# Patient Record
Sex: FEMALE | Race: OTHER | NOT HISPANIC OR LATINO | URBAN - METROPOLITAN AREA
[De-identification: names, ages, dates, MRNs, and addresses within clinical notes are randomized per-mention and may not be internally consistent; named-entity substitution may affect disease eponyms.]

---

## 2022-06-14 ENCOUNTER — OFFICE VISIT (OUTPATIENT)
Dept: URBAN - METROPOLITAN AREA CLINIC 128 | Facility: CLINIC | Age: 62
End: 2022-06-14
Payer: OTHER GOVERNMENT

## 2022-06-14 ENCOUNTER — WEB ENCOUNTER (OUTPATIENT)
Dept: URBAN - METROPOLITAN AREA CLINIC 128 | Facility: CLINIC | Age: 62
End: 2022-06-14

## 2022-06-14 VITALS
WEIGHT: 162 LBS | SYSTOLIC BLOOD PRESSURE: 132 MMHG | HEIGHT: 61 IN | TEMPERATURE: 97.1 F | HEART RATE: 74 BPM | DIASTOLIC BLOOD PRESSURE: 82 MMHG | BODY MASS INDEX: 30.58 KG/M2

## 2022-06-14 DIAGNOSIS — Z80.0 FAMILY HISTORY OF COLON CANCER IN MOTHER: ICD-10-CM

## 2022-06-14 DIAGNOSIS — K21.9 GERD: ICD-10-CM

## 2022-06-14 DIAGNOSIS — R74.8 ELEVATED LIPASE: ICD-10-CM

## 2022-06-14 DIAGNOSIS — R13.19 OTHER DYSPHAGIA: ICD-10-CM

## 2022-06-14 PROBLEM — 235595009 GASTROESOPHAGEAL REFLUX DISEASE: Status: ACTIVE | Noted: 2022-06-14

## 2022-06-14 PROCEDURE — 99214 OFFICE O/P EST MOD 30 MIN: CPT | Performed by: PHYSICIAN ASSISTANT

## 2022-06-14 RX ORDER — DICYCLOMINE HYDROCHLORIDE 10 MG/1
TAKE 1 CAPSULE (10 MG) BY ORAL ROUTE 3 TIMES PER DAY PRN DIARRHEA CAPSULE ORAL
Qty: 30 | Refills: 0 | Status: ACTIVE | COMMUNITY
Start: 2020-03-18

## 2022-06-14 RX ORDER — PANTOPRAZOLE SODIUM 20 MG/1
1 TABLET TABLET, DELAYED RELEASE ORAL ONCE A DAY
Qty: 30 | Refills: 5 | OUTPATIENT
Start: 2022-06-14

## 2022-06-14 NOTE — HPI-OTHER HISTORIES
Patient elicits the following symptoms: esophageal burning. She went to [facility] at [place] on 22 due to the esophageal pain. She has been having dysphagia solids and liquids. Duration of symptoms: x 3 weeks Location of symptoms: esophageal Associated symptoms: belching Prior over the counter or prescription medications: none Current stress: yes Any recent weight changes: none Any recent changes in diet: none Any past history of gastric/esophageal ulcers or Barretts esophagus: none Previous work up- labs,imaging:  At the hospital her labs were all normal except for a lipase of 185. 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 She denies lower GI symptoms. She has2-3 BMs a day with no constipation or diarrhea. Patient's mother  of colon cancer.

## 2022-06-21 ENCOUNTER — OFFICE VISIT (OUTPATIENT)
Dept: URBAN - METROPOLITAN AREA CLINIC 128 | Facility: CLINIC | Age: 62
End: 2022-06-21

## 2023-03-16 ENCOUNTER — LAB OUTSIDE AN ENCOUNTER (OUTPATIENT)
Dept: URBAN - METROPOLITAN AREA CLINIC 128 | Facility: CLINIC | Age: 63
End: 2023-03-16

## 2023-03-16 ENCOUNTER — WEB ENCOUNTER (OUTPATIENT)
Dept: URBAN - METROPOLITAN AREA CLINIC 128 | Facility: CLINIC | Age: 63
End: 2023-03-16

## 2023-03-16 ENCOUNTER — OFFICE VISIT (OUTPATIENT)
Dept: URBAN - METROPOLITAN AREA CLINIC 128 | Facility: CLINIC | Age: 63
End: 2023-03-16
Payer: OTHER GOVERNMENT

## 2023-03-16 VITALS
WEIGHT: 164 LBS | BODY MASS INDEX: 30.96 KG/M2 | SYSTOLIC BLOOD PRESSURE: 122 MMHG | TEMPERATURE: 97.2 F | HEIGHT: 61 IN | DIASTOLIC BLOOD PRESSURE: 66 MMHG

## 2023-03-16 DIAGNOSIS — K59.01 CONSTIPATION BY DELAYED COLONIC TRANSIT: ICD-10-CM

## 2023-03-16 DIAGNOSIS — K92.1 HEMATOCHEZIA: ICD-10-CM

## 2023-03-16 DIAGNOSIS — R10.13 EPIGASTRIC PAIN: ICD-10-CM

## 2023-03-16 PROCEDURE — 99214 OFFICE O/P EST MOD 30 MIN: CPT | Performed by: INTERNAL MEDICINE

## 2023-03-16 RX ORDER — PANTOPRAZOLE SODIUM 20 MG/1
1 TABLET TABLET, DELAYED RELEASE ORAL ONCE A DAY
Qty: 30 | Refills: 5 | Status: ACTIVE | COMMUNITY
Start: 2022-06-14

## 2023-03-16 RX ORDER — DICYCLOMINE HYDROCHLORIDE 10 MG/1
TAKE 1 CAPSULE (10 MG) BY ORAL ROUTE 3 TIMES PER DAY PRN DIARRHEA CAPSULE ORAL
Qty: 30 | Refills: 0 | Status: ACTIVE | COMMUNITY
Start: 2020-03-18

## 2023-03-16 NOTE — HPI-TODAY'S VISIT:
Mrs. Cotton comes in to the office today for a follow up visit because of a 5 day hx of epigastric discomfort with radiation in to the back. The pain does not wake her from sleep but can make it hard ot sleep at night.  She offers increased stress lately.  No N/V.  No increased use of NSAIDs.  No typical pyrosis or regurgitation.  BMs have been slightly tending toward constipation lately but she had a small BM yesterday and a more typical BM today.  No hx of Liver, gallbladder, or pancreas disease.  Her discomfort is better since BM earlier today.  Yesterday she had a single episode of BRB on the toilet tissue-- no bleeding to with BM.  Colonoscopy last in 2020 -- mild neoterminal ileum inflammation -- rectal erythema but biopsy was normal.  She offers all bloodwork was normal about 2 weeks ago at routined physical examination.

## 2023-03-16 NOTE — PHYSICAL EXAM GASTROINTESTINAL
Abdomen soft, mldly tender to palpation of the epigastrium, nondistended, no masses palpable , normal bowel sounds   Liver and Spleen , no hepatomegaly present, liver edge nontender , spleen not palpable   Rectal: deferred

## 2023-03-28 ENCOUNTER — LAB OUTSIDE AN ENCOUNTER (OUTPATIENT)
Dept: URBAN - METROPOLITAN AREA CLINIC 128 | Facility: CLINIC | Age: 63
End: 2023-03-28

## 2023-03-29 LAB
A/G RATIO: 1.3
ALBUMIN: 3.9
ALKALINE PHOSPHATASE: 90
ALT (SGPT): 18
AST (SGOT): 17
BILIRUBIN, TOTAL: 0.4
BUN/CREATININE RATIO: (no result)
BUN: 13
CALCIUM: 9.6
CARBON DIOXIDE, TOTAL: 30
CHLORIDE: 103
CREATININE: 0.66
EGFR: 99
GLOBULIN, TOTAL: 3
GLUCOSE: 109
LIPASE: 16
POTASSIUM: 3.6
PROTEIN, TOTAL: 6.9
SODIUM: 142

## 2023-05-04 ENCOUNTER — OFFICE VISIT (OUTPATIENT)
Dept: URBAN - METROPOLITAN AREA CLINIC 128 | Facility: CLINIC | Age: 63
End: 2023-05-04
Payer: OTHER GOVERNMENT

## 2023-05-04 VITALS
WEIGHT: 164 LBS | DIASTOLIC BLOOD PRESSURE: 72 MMHG | BODY MASS INDEX: 30.96 KG/M2 | SYSTOLIC BLOOD PRESSURE: 126 MMHG | HEIGHT: 61 IN | TEMPERATURE: 97.7 F

## 2023-05-04 DIAGNOSIS — R10.13 EPIGASTRIC PAIN: ICD-10-CM

## 2023-05-04 DIAGNOSIS — K21.9 GASTROESOPHAGEAL REFLUX DISEASE, UNSPECIFIED WHETHER ESOPHAGITIS PRESENT: ICD-10-CM

## 2023-05-04 DIAGNOSIS — K59.01 CONSTIPATION BY DELAYED COLONIC TRANSIT: ICD-10-CM

## 2023-05-04 DIAGNOSIS — K92.1 HEMATOCHEZIA: ICD-10-CM

## 2023-05-04 DIAGNOSIS — R74.8 ELEVATED LIPASE: ICD-10-CM

## 2023-05-04 DIAGNOSIS — Z80.0 FAMILY HISTORY OF COLON CANCER IN MOTHER: ICD-10-CM

## 2023-05-04 PROBLEM — 35298007: Status: ACTIVE | Noted: 2023-03-16

## 2023-05-04 PROBLEM — 235595009: Status: ACTIVE | Noted: 2023-05-04

## 2023-05-04 PROCEDURE — 99213 OFFICE O/P EST LOW 20 MIN: CPT | Performed by: INTERNAL MEDICINE

## 2023-05-04 RX ORDER — PANTOPRAZOLE SODIUM 20 MG/1
1 TABLET TABLET, DELAYED RELEASE ORAL ONCE A DAY
Qty: 30 | Refills: 5
Start: 2022-06-14

## 2023-05-04 RX ORDER — DICYCLOMINE HYDROCHLORIDE 10 MG/1
TAKE 1 CAPSULE (10 MG) BY ORAL ROUTE 3 TIMES PER DAY PRN DIARRHEA CAPSULE ORAL
Qty: 30 | Refills: 0 | Status: ACTIVE | COMMUNITY
Start: 2020-03-18

## 2023-05-04 RX ORDER — PANTOPRAZOLE SODIUM 20 MG/1
1 TABLET TABLET, DELAYED RELEASE ORAL ONCE A DAY
Qty: 30 | Refills: 5 | Status: ACTIVE | COMMUNITY
Start: 2022-06-14

## 2023-05-04 NOTE — HPI-TODAY'S VISIT:
Mrs. Cotton returns to the office for a follow up visit. She offers that her bowel movements are softly formed twice a day on miralax once or twice a day. Her Constipation has completely resolved as well as her hematochezia. Her lower abdominal discomfort has improved over 90%. Bloating is significantly less as well. No typical heartburn or reflux. She is under cardiology evaluation 4 episodic chest pains. She does offer that she has been under a lot of stress lately and that the chest pain may be related to stress. Colon cancer screening is up to date. Appetite is good in weight stable. Typical reflux symptoms are under very good control on pantoprazole 20 milligrams a day. We discussed continued use of pantoprazole and miralax.

## 2023-10-26 ENCOUNTER — TELEPHONE ENCOUNTER (OUTPATIENT)
Dept: URBAN - METROPOLITAN AREA CLINIC 128 | Facility: CLINIC | Age: 63
End: 2023-10-26

## 2023-10-27 ENCOUNTER — TELEPHONE ENCOUNTER (OUTPATIENT)
Dept: URBAN - METROPOLITAN AREA CLINIC 6 | Facility: CLINIC | Age: 63
End: 2023-10-27

## 2023-10-31 ENCOUNTER — OFFICE VISIT (OUTPATIENT)
Dept: URBAN - METROPOLITAN AREA CLINIC 128 | Facility: CLINIC | Age: 63
End: 2023-10-31
Payer: OTHER GOVERNMENT

## 2023-10-31 ENCOUNTER — LAB OUTSIDE AN ENCOUNTER (OUTPATIENT)
Dept: URBAN - METROPOLITAN AREA CLINIC 128 | Facility: CLINIC | Age: 63
End: 2023-10-31

## 2023-10-31 VITALS
SYSTOLIC BLOOD PRESSURE: 146 MMHG | HEIGHT: 61 IN | TEMPERATURE: 97.3 F | BODY MASS INDEX: 31.91 KG/M2 | DIASTOLIC BLOOD PRESSURE: 82 MMHG | WEIGHT: 169 LBS

## 2023-10-31 DIAGNOSIS — K59.09 CHRONIC CONSTIPATION: ICD-10-CM

## 2023-10-31 DIAGNOSIS — R10.84 ABDOMINAL PAIN, GENERALIZED: ICD-10-CM

## 2023-10-31 DIAGNOSIS — R13.19 ESOPHAGEAL DYSPHAGIA: ICD-10-CM

## 2023-10-31 PROBLEM — 14760008: Status: ACTIVE | Noted: 2023-10-31

## 2023-10-31 PROCEDURE — 99214 OFFICE O/P EST MOD 30 MIN: CPT | Performed by: INTERNAL MEDICINE

## 2023-10-31 RX ORDER — PANTOPRAZOLE SODIUM 20 MG/1
1 TABLET TABLET, DELAYED RELEASE ORAL ONCE A DAY
Qty: 30 | Refills: 5 | Status: ACTIVE | COMMUNITY
Start: 2022-06-14

## 2023-10-31 RX ORDER — DICYCLOMINE HYDROCHLORIDE 10 MG/1
TAKE 1 CAPSULE (10 MG) BY ORAL ROUTE 3 TIMES PER DAY PRN DIARRHEA CAPSULE ORAL
Qty: 30 | Refills: 0 | Status: ACTIVE | COMMUNITY
Start: 2020-03-18

## 2023-10-31 RX ORDER — LINACLOTIDE 145 UG/1
1 CAPSULE AT LEAST 30 MINUTES BEFORE THE FIRST MEAL OF THE DAY ON AN EMPTY STOMACH CAPSULE, GELATIN COATED ORAL ONCE A DAY
Qty: 90 CAPSULE | Refills: 3 | OUTPATIENT
Start: 2023-10-31 | End: 2024-10-25

## 2023-10-31 NOTE — PHYSICAL EXAM GASTROINTESTINAL
Abdomen soft, mildly tender RLQ and LLQ to palpation, well healed surgical scars, nondistended, no masses palpable , normal bowel sounds   Liver and Spleen , no hepatomegaly present, liver edge nontender , spleen not palpable   Rectal: deferred

## 2023-10-31 NOTE — HPI-TODAY'S VISIT:
Mrs. Cotton comes to the office today for follow up after ER evaluation for lower abdomen and back pain with pain down into her lateral and anterior thighs.  She offers that BMs have been daily though sometimes small volume.  CT at ER evaluation was normal except for large stool volume.  NO bleeding.  She offers alot of work stress.  There has also been progression of dysphagia for solids over the past 2 months -- history of previous dilation with resolution of dysphagia.  No new medications.  Partial transient relief of back and lower abdomen discomfort with bowel movement.  Discomfort also seems to improve with recumbency.  She notes discomfort recurs in the morning about 30 minutes after she rises.

## 2023-11-10 ENCOUNTER — LAB OUTSIDE AN ENCOUNTER (OUTPATIENT)
Dept: URBAN - METROPOLITAN AREA CLINIC 128 | Facility: CLINIC | Age: 63
End: 2023-11-10

## 2023-11-13 ENCOUNTER — OFFICE VISIT (OUTPATIENT)
Dept: URBAN - METROPOLITAN AREA CLINIC 128 | Facility: CLINIC | Age: 63
End: 2023-11-13

## 2023-12-06 ENCOUNTER — OFFICE VISIT (OUTPATIENT)
Dept: URBAN - METROPOLITAN AREA CLINIC 128 | Facility: CLINIC | Age: 63
End: 2023-12-06

## 2024-01-04 ENCOUNTER — OFFICE VISIT (OUTPATIENT)
Dept: URBAN - METROPOLITAN AREA CLINIC 128 | Facility: CLINIC | Age: 64
End: 2024-01-04
Payer: OTHER GOVERNMENT

## 2024-01-04 ENCOUNTER — LAB OUTSIDE AN ENCOUNTER (OUTPATIENT)
Dept: URBAN - METROPOLITAN AREA CLINIC 128 | Facility: CLINIC | Age: 64
End: 2024-01-04

## 2024-01-04 ENCOUNTER — DASHBOARD ENCOUNTERS (OUTPATIENT)
Age: 64
End: 2024-01-04

## 2024-01-04 VITALS
SYSTOLIC BLOOD PRESSURE: 134 MMHG | HEART RATE: 75 BPM | TEMPERATURE: 98.1 F | WEIGHT: 170.6 LBS | HEIGHT: 61 IN | DIASTOLIC BLOOD PRESSURE: 78 MMHG | BODY MASS INDEX: 32.21 KG/M2

## 2024-01-04 DIAGNOSIS — R13.19 ESOPHAGEAL DYSPHAGIA: ICD-10-CM

## 2024-01-04 DIAGNOSIS — K59.09 CHANGE IN BOWEL MOVEMENTS INTERMITTENT CONSTIPATION. URGENCY IN THE MORNING.: ICD-10-CM

## 2024-01-04 DIAGNOSIS — R10.84 ABDOMINAL CRAMPING, GENERALIZED: ICD-10-CM

## 2024-01-04 PROCEDURE — 99214 OFFICE O/P EST MOD 30 MIN: CPT | Performed by: PHYSICIAN ASSISTANT

## 2024-01-04 RX ORDER — LINACLOTIDE 145 UG/1
1 CAPSULE AT LEAST 30 MINUTES BEFORE THE FIRST MEAL OF THE DAY ON AN EMPTY STOMACH CAPSULE, GELATIN COATED ORAL ONCE A DAY
Qty: 90 CAPSULE | Refills: 3 | COMMUNITY
Start: 2023-10-31 | End: 2024-10-25

## 2024-01-04 RX ORDER — PANTOPRAZOLE SODIUM 20 MG/1
1 TABLET TABLET, DELAYED RELEASE ORAL ONCE A DAY
Qty: 30 | Refills: 5 | COMMUNITY
Start: 2022-06-14

## 2024-01-04 RX ORDER — DICYCLOMINE HYDROCHLORIDE 10 MG/1
TAKE 1 CAPSULE (10 MG) BY ORAL ROUTE 3 TIMES PER DAY PRN DIARRHEA CAPSULE ORAL
Qty: 30 | Refills: 0 | COMMUNITY
Start: 2020-03-18

## 2024-01-04 RX ORDER — FAMOTIDINE 20 MG/1
1 TABLET AT BEDTIME TABLET, FILM COATED ORAL ONCE A DAY
Qty: 30 TABLET | Refills: 5 | OUTPATIENT
Start: 2024-01-04

## 2024-01-04 NOTE — HPI-TODAY'S VISIT:
Mrs. Cotton comes to the office today for follow up after ER evaluation for lower abdomen and back pain with pain down into her lateral and anterior thighs.  She offers that BMs have been daily though sometimes small volume.  CT at ER evaluation was normal except for large stool volume.  No bleeding.  She offers alot of work stress.  There has also been progression of dysphagia for solids over the past 2 months -- history of previous dilation with resolution of dysphagia.  No new medications.  Partial transient relief of back and lower abdomen discomfort with bowel movement.  Discomfort also seems to improve with recumbency.  She notes discomfort recurs in the morning about 30 minutes after she rises. 2020 EGD revealed duodenitis and reflux esophagitis.  colonosocpy revealed focal active enteritis in the TI. Her mother  of colon cancer

## 2024-01-04 NOTE — PHYSICAL EXAM GASTROINTESTINAL
Abdomen , soft, nontender, nondistended , no guarding or rigidity , no masses palpable , normal bowel sounds, negative Acuña's sign, negative psoas and obturators signs, negative CVA tenderness bilaterally Liver and Spleen , no hepatosplenomegaly Rectal deferred